# Patient Record
Sex: MALE | Race: WHITE | NOT HISPANIC OR LATINO | Employment: UNEMPLOYED | URBAN - METROPOLITAN AREA
[De-identification: names, ages, dates, MRNs, and addresses within clinical notes are randomized per-mention and may not be internally consistent; named-entity substitution may affect disease eponyms.]

---

## 2022-07-27 ENCOUNTER — OFFICE VISIT (OUTPATIENT)
Dept: URGENT CARE | Facility: CLINIC | Age: 32
End: 2022-07-27
Payer: COMMERCIAL

## 2022-07-27 VITALS
RESPIRATION RATE: 16 BRPM | DIASTOLIC BLOOD PRESSURE: 92 MMHG | SYSTOLIC BLOOD PRESSURE: 160 MMHG | OXYGEN SATURATION: 100 % | HEIGHT: 70 IN | WEIGHT: 220.46 LBS | BODY MASS INDEX: 31.56 KG/M2 | TEMPERATURE: 98 F | HEART RATE: 97 BPM

## 2022-07-27 DIAGNOSIS — N50.812 PAIN IN LEFT TESTICLE: Primary | ICD-10-CM

## 2022-07-27 PROCEDURE — 99213 OFFICE O/P EST LOW 20 MIN: CPT

## 2022-07-27 NOTE — PATIENT INSTRUCTIONS
Testicular pain  - Follow up with Dr Markel Colindres or Dr Du Kami  - Possible varicocele into left testicle, inguinal hernia, or chronic trauma  - Consider wearing tighter underwear  - consider icing your genitals when you get the sensation  - Go to Emergency department if you have severe pain that is unbearable and persistent

## 2022-07-27 NOTE — PROGRESS NOTES
[address]        NAME: Rowdy Martinez is a 33 y o  male  : 1990    MRN: [id number]  DATE: 2022  TIME: 10:30 AM    Assessment and Plan   Pain in left testicle [N50 [id number]]  1  Pain in left testicle  Ambulatory Referral to General Surgery         Patient Instructions     Testicular pain  - Follow up with Dr Markel Colindres or Dr Du Kami  - Possible varicocele into left testicle, inguinal hernia, or chronic trauma  - Consider wearing tighter underwear  - consider icing your genitals when you get the sensation  - Go to Emergency department if you have severe pain that is unbearable and persistent    Follow up with PCP in 3-5 days  Proceed to  ER if symptoms worsen  Chief Complaint     Chief Complaint   Patient presents with    Testicle Pain     Pain in scrotum for 3 weeks         History of Present Illness     Rowdy Martinez is a 33 y o  male who presents with complaint of intermittent left sided testicular soreness for the past 3 weeks  He states that he is on vacation here in [place] until 2022, about 5 weeks away  He reports intermittent left-sided testicular shortness and lower abdominal pain without any inciting incident  He notices that it is worse when he is sitting in his friend's truck which is in [place] [employer], being more rigid on the road  When he exits the truck and walks around, his pain and soreness reduce  He says the sensation can last anywhere from a few minutes to a few hours, but does not know if it is psychological   He denies any recent sexual intercourse concern for STDs  He reports noticing that the scrotum superior to the testicle is thicker and more plump on the left side compared to the right when his scrotum is relaxed  He denies any palpable mass  He denies nausea, vomiting, diarrhea, fevers, chills  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, however he has not engaged in any heavy lifting over the past few weeks    He reported spasms in his lower left abdomen a few days ago that subsided shortly thereafter a presented  Review of Systems   Review of Systems   Constitutional: Negative for chills, fatigue and fever  Respiratory: Negative for shortness of breath and wheezing  Cardiovascular: Negative for chest pain and palpitations  Gastrointestinal: Negative for abdominal distention, abdominal pain, constipation, diarrhea, nausea and vomiting  Genitourinary: Positive for genital sores and testicular pain  Negative for difficulty urinating, dysuria, flank pain, frequency, hematuria, penile discharge, penile pain, penile swelling, scrotal swelling and urgency  Musculoskeletal: Negative for back pain  Skin: Negative for color change, pallor, rash and wound  Current Medications     No current outpatient medications on file  Current Allergies     Allergies as of 07/27/2022 - Reviewed 07/27/2022   Allergen Reaction Noted    Amoxicillin Other (See Comments) 07/27/2022            The following portions of the patient's history were reviewed and updated as appropriate: allergies, current medications, past family history, past medical history, past social history, past surgical history and problem list      History reviewed  No pertinent past medical history  History reviewed  No pertinent surgical history  History reviewed  No pertinent family history  Medications have been verified  Objective   /92   Pulse 97   Temp 98 °F (36 7 °C) (Temporal)   Resp 16   Ht 5' 10" (1 778 m)   Wt 100 kg (220 lb 7 4 oz)   SpO2 100%   BMI 31 63 kg/m²   No LMP for male patient  Physical Exam     Physical Exam  Vitals and nursing note reviewed  Constitutional:       General: He is not in acute distress  Appearance: Normal appearance  He is normal weight  He is not ill-appearing  HENT:      Head: Normocephalic and atraumatic     Cardiovascular:      Rate and Rhythm: Normal rate and regular rhythm  Pulses: Normal pulses  Heart sounds: Normal heart sounds  No murmur heard  No friction rub  No gallop  Pulmonary:      Effort: Pulmonary effort is normal       Breath sounds: Normal breath sounds  No stridor  No wheezing, rhonchi or rales  Abdominal:      General: Abdomen is flat  Bowel sounds are normal  There is no distension  Palpations: Abdomen is soft  Tenderness: There is no abdominal tenderness  There is no right CVA tenderness, left CVA tenderness or guarding  Hernia: There is no hernia in the left inguinal area or right inguinal area  Genitourinary:     Penis: Uncircumcised  No phimosis, paraphimosis, tenderness, discharge or swelling  Testes: Normal          Right: Mass, tenderness, swelling, testicular hydrocele or varicocele not present  Right testis is descended  Cremasteric reflex is present  Left: Mass, tenderness, swelling, testicular hydrocele or varicocele not present  Left testis is descended  Cremasteric reflex is present  Epididymis:      Right: Normal  No tenderness  Left: Normal  No tenderness  Beltran stage (genital): 5  Comments: Testicles were elevated for exam making vessels difficult to appreciate  Possible varicocele  Possible weakness in the left inguinal canal  No obvious hernias were appreciated  Lymphadenopathy:      Lower Body: No right inguinal adenopathy  No left inguinal adenopathy  Neurological:      Mental Status: He is alert

## 2022-07-28 ENCOUNTER — TELEPHONE (OUTPATIENT)
Dept: OTHER | Facility: OTHER | Age: 32
End: 2022-07-28

## 2022-08-01 ENCOUNTER — TELEPHONE (OUTPATIENT)
Dept: SURGERY | Facility: CLINIC | Age: 32
End: 2022-08-01

## 2022-08-01 ENCOUNTER — CONSULT (OUTPATIENT)
Dept: SURGERY | Facility: CLINIC | Age: 32
End: 2022-08-01

## 2022-08-01 VITALS
BODY MASS INDEX: 32.28 KG/M2 | WEIGHT: 225 LBS | HEART RATE: 103 BPM | RESPIRATION RATE: 18 BRPM | OXYGEN SATURATION: 98 % | TEMPERATURE: 97.4 F | DIASTOLIC BLOOD PRESSURE: 78 MMHG | SYSTOLIC BLOOD PRESSURE: 122 MMHG

## 2022-08-01 DIAGNOSIS — N50.812 PAIN IN LEFT TESTICLE: ICD-10-CM

## 2022-08-01 DIAGNOSIS — K40.90 LEFT INGUINAL HERNIA: Primary | ICD-10-CM

## 2022-08-01 PROCEDURE — 99243 OFF/OP CNSLTJ NEW/EST LOW 30: CPT | Performed by: SPECIALIST

## 2022-08-01 NOTE — TELEPHONE ENCOUNTER
Called patient but couldn't leave me a msg because his voicemail inbox isn't set up  I spoke with [facility] training help desk who states we did everything correct with entering insurance and address however because [place] a out [place] ins epic isn't recognizing it and has to be handle in a different dept  Patient may or may not receive a bill   If he does he needs to give the bill to his ins that should contact the billing dept directly at [phone] or [phone]

## 2022-08-01 NOTE — PATIENT INSTRUCTIONS
You have a Left Inguinal Hernia  The hernia is prolapsed when standing, and reduces spontaneously when supine  The hernia will become larger, and more dangerous over time, and should be repaired  There are 2 options for repair, the traditional open repair, and the laparoscopic repair  They are two different surgeries that achieve the same result  At this point lifting and straining will make the pain worse, and you should realize this and adjust your activity to minimize the pain  You can use Advil and or Tylenol for pain  If you develop intractable pain, associated with a lump in the groin that you can not gently reduce, then you most likely have an incarceration of bowel in the hernia, and need immediate attention  In which case head to the nearest emergency room  Return if you want to schedule surgery while you are in the [place]  Inguinal Hernia   WHAT YOU NEED TO KNOW:   What is an inguinal hernia? An inguinal hernia happens when organs or abdominal tissue push through a weak spot in the abdominal wall  The abdominal wall is made of fat and muscle  It holds the intestines in place  The hernia may contain fluid, tissue from the abdomen, or part of an organ (such as an intestine)  What causes an inguinal hernia? The cause of your hernia may not be known  You may have been born with a weak spot or opening in the abdominal wall  The area may have become weak from surgery or an injury  You may get a hernia after you lift something heavy or strain during a bowel movement  Your risk for a hernia may be increased if you smoke or you are overweight  Inguinal hernias are more common in males  A family history of hernias increases your risk for an inguinal hernia  What are the signs and symptoms of an inguinal hernia? A hernia may happen over time or it may happen suddenly  Some movements can make symptoms worse   Examples include when you cough, sneeze, strain to have a bowel movement, lift, or stand for a long time  You may have any of the following:  A soft lump or bulge in your groin, lower abdomen, or scrotum     Pain or burning in your abdomen    How is an inguinal hernia diagnosed? Your healthcare provider may ask you to bend or cough to see if he can feel your hernia  You may need blood or urine tests to check your kidney function or find signs of infection  X-ray, MRI, CT scan, or ultrasound pictures may show blockage in the intestines or lack of blood flow to organs  You may be given contrast liquid to help the organs show up better in the pictures  Tell the healthcare provider if you have ever had an allergic reaction to contrast liquid  Do not enter the MRI room with anything metal  Metal can cause serious injury  Tell the healthcare provider if you have any metal in or on your body  How is an inguinal hernia treated? A manual reduction of the hernia  may be needed  Manual reduction means your healthcare provider will use his hands to put firm, steady pressure on your hernia  He will continue until the hernia disappears inside the abdominal wall  Surgery  may be needed if the hernia stops blood flow to any of the organs  Surgery may also be needed if the hernia causes a hole in the intestines, or blocks the intestines  How can I manage my symptoms and prevent another hernia? Do not lift anything heavy  Heavy lifting can make your hernia worse or cause another hernia  Ask your healthcare provider how much is safe for you to lift  Drink liquids as directed  Liquids may prevent constipation and straining during a bowel movement  Ask how much liquid to drink each day and which liquids are best for you  Eat foods high in fiber  Fiber may prevent constipation and straining during a bowel movement  Foods that contain fiber include fruits, vegetables, beans, lentils, and whole grains  Maintain a healthy weight    If you are overweight, weight loss may prevent your hernia from getting worse  It may also prevent another hernia  Talk to your healthcare provider about exercise and how to lose weight safely if you are overweight  Do not smoke  Nicotine and other chemicals in cigarettes and cigars can weaken the abdominal wall  This may increase your risk for another hernia  Ask your healthcare provider for information if you currently smoke and need help to quit  E-cigarettes or smokeless tobacco still contain nicotine  Talk to your healthcare provider before you use these products  Take NSAIDs as directed  NSAIDs, such as ibuprofen, help decrease swelling, pain, and fever  NSAIDs can cause stomach bleeding or kidney problems in certain people  If you take blood thinner medicine, always ask your healthcare provider if NSAIDs are safe for you  Always read the medicine label and follow directions  When should I seek immediate care? You have severe abdominal pain with nausea and vomiting  Your abdomen is larger than usual      Your hernia gets bigger or is purple or blue  You see blood in your bowel movements  You feel weak, dizzy, or faint  When should I contact my healthcare provider? You have a fever  You have questions or concerns about your condition or care  CARE AGREEMENT:   You have the right to help plan your care  Learn about your health condition and how it may be treated  Discuss treatment options with your healthcare providers to decide what care you want to receive  You always have the right to refuse treatment  The above information is an  only  It is not intended as medical advice for individual conditions or treatments  Talk to your doctor, nurse or pharmacist before following any medical regimen to see if it is safe and effective for you  © Copyright [employer] 2022 Information is for End User's use only and may not be sold, redistributed or otherwise used for commercial purposes   All illustrations and images included in CareNotes® are the copyrighted property of A D A M , Inc  or [address]     Inguinal Hernia   WHAT YOU NEED TO KNOW:   What is an inguinal hernia? An inguinal hernia happens when organs or abdominal tissue push through a weak spot in the abdominal wall  The abdominal wall is made of fat and muscle  It holds the intestines in place  The hernia may contain fluid, tissue from the abdomen, or part of an organ (such as an intestine)  What causes an inguinal hernia? The cause of your hernia may not be known  You may have been born with a weak spot or opening in the abdominal wall  The area may have become weak from surgery or an injury  You may get a hernia after you lift something heavy or strain during a bowel movement  Your risk for a hernia may be increased if you smoke or you are overweight  Inguinal hernias are more common in males  A family history of hernias increases your risk for an inguinal hernia  What are the signs and symptoms of an inguinal hernia? A hernia may happen over time or it may happen suddenly  Some movements can make symptoms worse  Examples include when you cough, sneeze, strain to have a bowel movement, lift, or stand for a long time  You may have any of the following:  A soft lump or bulge in your groin, lower abdomen, or scrotum     Pain or burning in your abdomen    How is an inguinal hernia diagnosed? Your healthcare provider may ask you to bend or cough to see if he can feel your hernia  You may need blood or urine tests to check your kidney function or find signs of infection  X-ray, MRI, CT scan, or ultrasound pictures may show blockage in the intestines or lack of blood flow to organs  You may be given contrast liquid to help the organs show up better in the pictures  Tell the healthcare provider if you have ever had an allergic reaction to contrast liquid  Do not enter the MRI room with anything metal  Metal can cause serious injury   Tell the healthcare provider if you have any metal in or on your body  How is an inguinal hernia treated? A manual reduction of the hernia  may be needed  Manual reduction means your healthcare provider will use his hands to put firm, steady pressure on your hernia  He will continue until the hernia disappears inside the abdominal wall  Surgery  may be needed if the hernia stops blood flow to any of the organs  Surgery may also be needed if the hernia causes a hole in the intestines, or blocks the intestines  How can I manage my symptoms and prevent another hernia? Do not lift anything heavy  Heavy lifting can make your hernia worse or cause another hernia  Ask your healthcare provider how much is safe for you to lift  Drink liquids as directed  Liquids may prevent constipation and straining during a bowel movement  Ask how much liquid to drink each day and which liquids are best for you  Eat foods high in fiber  Fiber may prevent constipation and straining during a bowel movement  Foods that contain fiber include fruits, vegetables, beans, lentils, and whole grains  Maintain a healthy weight  If you are overweight, weight loss may prevent your hernia from getting worse  It may also prevent another hernia  Talk to your healthcare provider about exercise and how to lose weight safely if you are overweight  Do not smoke  Nicotine and other chemicals in cigarettes and cigars can weaken the abdominal wall  This may increase your risk for another hernia  Ask your healthcare provider for information if you currently smoke and need help to quit  E-cigarettes or smokeless tobacco still contain nicotine  Talk to your healthcare provider before you use these products  Take NSAIDs as directed  NSAIDs, such as ibuprofen, help decrease swelling, pain, and fever  NSAIDs can cause stomach bleeding or kidney problems in certain people   If you take blood thinner medicine, always ask your healthcare provider if NSAIDs are safe for you  Always read the medicine label and follow directions  When should I seek immediate care? You have severe abdominal pain with nausea and vomiting  Your abdomen is larger than usual      Your hernia gets bigger or is purple or blue  You see blood in your bowel movements  You feel weak, dizzy, or faint  When should I contact my healthcare provider? You have a fever  You have questions or concerns about your condition or care  CARE AGREEMENT:   You have the right to help plan your care  Learn about your health condition and how it may be treated  Discuss treatment options with your healthcare providers to decide what care you want to receive  You always have the right to refuse treatment  The above information is an  only  It is not intended as medical advice for individual conditions or treatments  Talk to your doctor, nurse or pharmacist before following any medical regimen to see if it is safe and effective for you  © Copyright [employer] 2022 Information is for End User's use only and may not be sold, redistributed or otherwise used for commercial purposes   All illustrations and images included in CareNotes® are the copyrighted property of [employer] , Inc  or [address] [place] [address]

## 2022-08-01 NOTE — ASSESSMENT & PLAN NOTE
The patient is a robust 26-year-old white male, who works in the paving industry spreading black top  He presented to urgent care 4 days earlier with a complaint of pain in his left testicle  He has subsequently referred to General surgery, and the presence of a tender left inguinal hernia, prolapsed when standing, reduces spontaneously when supine, is confirmed  The anatomy and natural history of by inguinal hernias were were discussed at length  The patient understands that surgery will be required, however he is returning to his [facility] in a few weeks, and wishes to have the problem attended to in [facility] when he returns  It was also impressed upon the patient that if he were to develop signs and symptoms of incarceration, he would require immediate attention  He will follow up, or go to the emergency room on an as-needed basis

## 2022-08-01 NOTE — PROGRESS NOTES
Assessment/Plan:    Left inguinal hernia  The patient is a robust 58-year-old white male, who works in the paving industry spreading black top  He presented to urgent care 4 days earlier with a complaint of pain in his left testicle  He has subsequently referred to General surgery, and the presence of a tender left inguinal hernia, prolapsed when standing, reduces spontaneously when supine, is confirmed  The anatomy and natural history of by inguinal hernias were were discussed at length  The patient understands that surgery will be required, however he is returning to his [facility] in a few weeks, and wishes to have the problem attended to in [facility] when he returns  It was also impressed upon the patient that if he were to develop signs and symptoms of incarceration, he would require immediate attention  He will follow up, or go to the emergency room on an as-needed basis  Diagnoses and all orders for this visit:    Left inguinal hernia    Pain in left testicle  -     Ambulatory Referral to General Surgery          Subjective:      Patient ID: Clark Henry is a 33 y o  male  The patient was referred for surgical evaluation regarding left groin pain  The patient was seen at urgent care, and referred to surgery with the suspected left inguinal hernia  At this time left inguinal hernia was confirmed, it is prolapsed when standing and reduces spontaneously when supine  The patient was offered surgery, but declines at this time is here he is returning to [facility], where he is a permanent resident  He will seek surgical care when he arrives back in his home country  The following portions of the patient's history were reviewed and updated as appropriate: allergies, current medications, past family history, past medical history, past social history, past surgical history and problem list     Review of Systems   Constitutional: Negative  HENT: Negative  Respiratory: Negative  Cardiovascular: Negative  Gastrointestinal: Negative  Negative for constipation, nausea and vomiting  Genitourinary: Positive for scrotal swelling (left sided, occurs during the day, resolves when resting) and testicular pain (Left side, when active during the day)  Negative for difficulty urinating and urgency  Musculoskeletal: Negative  Skin: Negative  Neurological: Negative  Psychiatric/Behavioral: Negative  Objective:      /78 (BP Location: Left arm, Patient Position: Sitting, Cuff Size: Large)   Pulse 103   Temp (!) 97 4 °F (36 3 °C)   Resp 18   Wt 102 kg (225 lb)   SpO2 98%   BMI 32 28 kg/m²          Physical Exam  Constitutional:       Appearance: Normal appearance  HENT:      Head: Normocephalic  Eyes:      General: No scleral icterus  Pupils: Pupils are equal, round, and reactive to light  Cardiovascular:      Rate and Rhythm: Normal rate and regular rhythm  Heart sounds: Normal heart sounds  Pulmonary:      Effort: Pulmonary effort is normal       Breath sounds: Normal breath sounds  Abdominal:      General: Abdomen is flat  There is no distension  Palpations: Abdomen is soft  Genitourinary:     Penis: Normal        Testes: Normal       Comments: With tender left inguinal hernia, prolapsed when standing, reduces spontaneously when supine  Musculoskeletal:         General: No swelling  Normal range of motion  Cervical back: Neck supple  Skin:     General: Skin is warm and dry  Neurological:      General: No focal deficit present  Mental Status: He is oriented to person, place, and time     Psychiatric:         Mood and Affect: Mood normal          Behavior: Behavior normal